# Patient Record
Sex: FEMALE | Race: WHITE | NOT HISPANIC OR LATINO | Employment: OTHER | URBAN - METROPOLITAN AREA
[De-identification: names, ages, dates, MRNs, and addresses within clinical notes are randomized per-mention and may not be internally consistent; named-entity substitution may affect disease eponyms.]

---

## 2022-08-30 ENCOUNTER — HOSPITAL ENCOUNTER (EMERGENCY)
Facility: CLINIC | Age: 72
Discharge: HOME | End: 2022-08-30
Attending: PHYSICIAN ASSISTANT | Admitting: PHYSICIAN ASSISTANT
Payer: COMMERCIAL

## 2022-08-30 VITALS
DIASTOLIC BLOOD PRESSURE: 72 MMHG | HEART RATE: 69 BPM | TEMPERATURE: 97.9 F | SYSTOLIC BLOOD PRESSURE: 169 MMHG | RESPIRATION RATE: 15 BRPM | OXYGEN SATURATION: 98 %

## 2022-08-30 DIAGNOSIS — H92.11 OTORRHEA, RIGHT: ICD-10-CM

## 2022-08-30 PROCEDURE — G0463 HOSPITAL OUTPT CLINIC VISIT: HCPCS | Performed by: PHYSICIAN ASSISTANT

## 2022-08-30 PROCEDURE — 99203 OFFICE O/P NEW LOW 30 MIN: CPT | Performed by: PHYSICIAN ASSISTANT

## 2022-08-30 RX ORDER — OFLOXACIN 3 MG/ML
4 SOLUTION/ DROPS OPHTHALMIC 2 TIMES DAILY
Qty: 3 ML | Refills: 0 | Status: SHIPPED | OUTPATIENT
Start: 2022-08-30 | End: 2022-09-06

## 2022-08-30 NOTE — ED PROVIDER NOTES
History     Chief Complaint   Patient presents with     Otalgia     HPI  Zahra Triana is a 71 year old female who presents to urgent care with concerns over right ear discomfort, decreased hearing which been present for approximately last week.  Patient also reports possible discharge or drainage from the ear and has had minimal rhinorrhea, post-nasal drainage, sore throat.   She has not had any fever, chills, myalgias, cough, dyspnea, wheezing, vomiting, diarrhea or abdominal pain.      Allergies:  No Known Allergies    Problem List:    There are no problems to display for this patient.     Past Medical History:    No past medical history on file.    Past Surgical History:    No past surgical history on file.    Family History:    No family history on file.    Social History:  Marital Status:   [2]        Medications:    ofloxacin (OCUFLOX) 0.3 % ophthalmic solution      Review of Systems  CONSTITUTIONAL:NEGATIVE for fever, chills, change in weight  INTEGUMENTARY/SKIN: NEGATIVE for worrisome rashes, moles or lesions  EYES: NEGATIVE for vision changes or irritation  ENT/MOUTH: POSITIVE for right ear discomfort, decreased hearing, drainage, sore throat, nasal congestion, post-nasal drainage  RESP:NEGATIVE for significant cough or SOB  GI: NEGATIVE for abdominal pain, diarrhea, nausea and vomiting  Physical Exam   BP: (!) 169/72  Pulse: 69  Temp: 97.9  F (36.6  C)  Resp: 15  SpO2: 98 %  Physical Exam  The right TM is not visualized secondary to canal discharge      The right auditory canal is obstructed by white/gray discharge, canal is erythematous, swollen, no significant tenderness to palpation of tragus   The left TM is normal: no effusions, no erythema, and normal landmarks  The left auditory canal is normal and without drainage, edema or erythema  Oropharynx exam is normal: no lesions, erythema, adenopathy or exudate.  GENERAL: no acute distress  EYES: EOMI,  PERRL, conjunctiva clear  NECK:  supple, non-tender to palpation, no adenopathy noted  RESP: lungs clear to auscultation - no rales, rhonchi or wheezes  CV: regular rates and rhythm, normal S1 S2, no murmur noted  SKIN: no suspicious lesions or rashes  ED Course           Procedures       Critical Care time:  none        No results found for this or any previous visit (from the past 24 hour(s)).  Medications - No data to display    Assessments & Plan (with Medical Decision Making)     I have reviewed the nursing notes.  I have reviewed the findings, diagnosis, plan and need for follow up with the patient.       New Prescriptions    OFLOXACIN (OCUFLOX) 0.3 % OPHTHALMIC SOLUTION    Place 4 drops into the right ear 2 times daily for 7 days     Final diagnoses:   Otorrhea, right     8-year-old female presents to urgent care with concerns over 1 week history of right ear discomfort, decreased hearing, discharge.  She had elevated blood pressure upon arrival, remainder of vital signs stable.  Physical exam findings appear consistent with acute otitis externa differential also include otitis media with TM perforation. Discharge does not appear consistent with cerumen impaction.  She was discharged home stable with prescription for ofloxacin drops 4 drops in the ear twice daily for 7 days.  Follow-up with primary care provider if no improvement within the next 3 to 5 days.  Worrisome reasons to return to the ER/UC sooner discussed.     Disclaimer: This note consists of symbols derived from keyboarding, dictation, and/or voice recognition software. As a result, there may be errors in the script that have gone undetected.  Please consider this when interpreting information found in the chart.    8/30/2022   [facility] EMERGENCY DEPT    Simran Christianson PA-C  08/31/22 1147

## 2023-08-20 ENCOUNTER — OFFICE VISIT (OUTPATIENT)
Dept: URGENT CARE | Facility: URGENT CARE | Age: 73
End: 2023-08-20
Payer: COMMERCIAL

## 2023-08-20 VITALS
TEMPERATURE: 98.4 F | WEIGHT: 206.2 LBS | OXYGEN SATURATION: 95 % | HEART RATE: 75 BPM | SYSTOLIC BLOOD PRESSURE: 143 MMHG | DIASTOLIC BLOOD PRESSURE: 73 MMHG

## 2023-08-20 DIAGNOSIS — H60.502 ACUTE OTITIS EXTERNA OF LEFT EAR, UNSPECIFIED TYPE: Primary | ICD-10-CM

## 2023-08-20 PROCEDURE — 99203 OFFICE O/P NEW LOW 30 MIN: CPT

## 2023-08-20 RX ORDER — NEOMYCIN SULFATE, POLYMYXIN B SULFATE, HYDROCORTISONE 3.5; 10000; 1 MG/ML; [USP'U]/ML; MG/ML
3 SOLUTION/ DROPS AURICULAR (OTIC) 3 TIMES DAILY
Qty: 10 ML | Refills: 0 | Status: SHIPPED | OUTPATIENT
Start: 2023-08-20 | End: 2023-08-27

## 2023-08-20 ASSESSMENT — PAIN SCALES - GENERAL: PAINLEVEL: MILD PAIN (2)

## 2023-08-20 NOTE — PATIENT INSTRUCTIONS
Diagnosis: OTITIS EXTERNA (outer ear canal infection)    Plan:   Antibiotic drops as directed   Symptoms usually resolve in 48-72hrs   NSAIDs and tylenol for pain and to reduce swelling   Avoid submerging under water until healed   Return or follow up with PCP vs ENT if no improvement    Monitor for:   Ear pain becomes worse or doesn't improve after 3 days of treatment  Redness or swelling of the outer ear occurs or gets worse  Headache  Fever of 101 F  Seizure  Unusual drowsiness or confusion  Unusual painful or stiff neck        External Ear Infection  External otitis (also called  swimmer's ear ) is an infection in the ear canal. It's often caused by bacteria or fungus.   It can occur a few days after water gets trapped in the ear canal (from swimming or bathing). It can also occur after cleaning too deeply in the ear canal with a cotton swab or other object. Sometimes, hair care products get into the ear canal and cause this problem.  Symptoms can include pain, fever, itching, redness, drainage, or swelling of the ear canal. Temporary hearing loss may also occur.

## 2023-08-20 NOTE — PROGRESS NOTES
URGENT CARE  Assessment & Plan   Assessment:   Zahra Triana is a 72 year old female who's clinical presentation today is consistent with:   1. Acute otitis externa of left ear, unspecified type  - neomycin-polymyxin-hydrocortisone (CORTISPORIN)   Plan:  Will treat patient's otitis externa today with topical antibacterial otic drops, Educated patient that symptoms should resolve in 48-72hrs and they should return for follow up and further evaluation if there is no improvement in 2-3 days (possibly rule out fungal pathology).   Encouraged patient to use tylenol or NSAIDs for pain management  No alarm signs or symptoms present   Differential Diagnoses for this patient's chief complaint that I considered include:  AOM, OME, otitis externa, viral URI, other bacterial pathology, ceruminosis, eustachian tube dysfunction, meniere's , myringitis, mastoiditis, Labyrinthitis, vestibular pathology, Sensory or conductive hearing loss, tinnitus}      Patient is} agreeable to treatment plan and state they will follow-up if symptoms do not improve and/or if symptoms worsen   see patient's AVS 'monitor for' section for specific patient instructions given and discussed regarding what to watch for and when to follow up    MOOK[facility] CARE [place]      ______________________________________________________________________      Subjective     HPI: Zahra Triana  is a 72 year old  female who presents today for evaluation the following concerns:   Patient presents today  endorsing ear pain on the left, patient states the pain started 3 days ago, on 8/17/23   Symptoms include ear pain   Patient  deny any: fever/chills, malaise, vomiting, diarrhea, or headaches   Patient  state prior history of ear infections: she just had one on the right side a month ago   Patient endorses external ear is tender to touch.     Review of Systems:  Pertinent review of systems as reflected in HPI, otherwise  negative.     Objective    Physical Exam:  Vitals:    08/20/23 1126   BP: (!) 143/73   Pulse: 75   Temp: 98.4  F (36.9  C)   TempSrc: Tympanic   SpO2: 95%   Weight: 93.5 kg (206 lb 3.2 oz)      General:   alert and oriented, no acute distress, non ill-appearing   Vital signs reviewed: afebrile and normotensive    EARS: bilateral TMs  intact, translucent gray in color with normal landmarks present no  erythema or bulging tympanic membrane     Right  - Canal is without swelling, erythema or cerumen  Left  -canal has a moderate amount of erythema and swelling, no cerumen    Drainage noted as white and adherent to surrounding canal        ______________________________________________________________________    I explained my diagnostic considerations and recommendations to the patient, who voiced understanding and agreement with the treatment plan.   All questions were answered.   We discussed potential side effects, risks and benefits of any prescribed or recommended therapies, as well as expectations for response to treatments.  Please see AVS for any patient instructions & handouts given.   Patient was advised to contact the Nurse Care Line, their Primary Care provider, Urgent Care, or the Emergency Department if there are new or worsening symptoms, or call 911 for emergencies.